# Patient Record
(demographics unavailable — no encounter records)

---

## 2025-04-16 NOTE — ASSESSMENT
[Vaccines Reviewed] : Immunizations reviewed today. Please see immunization details in the vaccine log within the immunization flowsheet.  [FreeTextEntry1] : Reviewed age appropriate preventive screening with patient today and importance of regular screening as indicated. Encouraged exercise of at least 30 mins daily of moderate activity as tolerated.  If unable to participate in moderate activity, encouraged walking daily for 20 to 30mins. Discussed healthy dietary intake of vegetables, whole grains, lean proteins with avoidance of high sugar and sodium intake. Completed labs in office today, will await results and notify patient accordingly EKG reviewed today-stable Chronic medications refilled

## 2025-04-16 NOTE — HEALTH RISK ASSESSMENT
[0] : 2) Feeling down, depressed, or hopeless: Not at all (0) [Never] : Never [With Family] : lives with family [] :  [Feels Safe at Home] : Feels safe at home [Fully functional (bathing, dressing, toileting, transferring, walking, feeding)] : Fully functional (bathing, dressing, toileting, transferring, walking, feeding) [Fully functional (using the telephone, shopping, preparing meals, housekeeping, doing laundry, using] : Fully functional and needs no help or supervision to perform IADLs (using the telephone, shopping, preparing meals, housekeeping, doing laundry, using transportation, managing medications and managing finances) [Good] : ~his/her~  mood as  good [Yes] : Yes [No] : In the past 12 months have you used drugs other than those required for medical reasons? No [Time Spent: ___ Minutes] : I spent [unfilled] minutes performing a depression screening for this patient. [Patient reported mammogram was normal] : Patient reported mammogram was normal [Patient reported colonoscopy was abnormal] : Patient reported colonoscopy was abnormal [de-identified] : Psych, renal [[id number]] : 0 [Reports changes in hearing] : Reports no changes in hearing [Reports changes in vision] : Reports no changes in vision [Reports changes in dental health] : Reports no changes in dental health [MammogramDate] : 08/24 [ColonoscopyDate] : 06/17 [ColonoscopyComments] : Polyp

## 2025-04-16 NOTE — HISTORY OF PRESENT ILLNESS
[FreeTextEntry1] : CPE [de-identified] : Here for CPE and medication refills No acute complaints Following w/ psych for mood disorder-doing well on current medications -HTN: Controlled; denies CP or dizziness -LDL at goal on lovastatin

## 2025-04-22 NOTE — ASSESSMENT
[FreeTextEntry1] :  # Facial Rhytides  BOTOX COSMETIC Procedure: Botulinum toxin injection Indication: Dynamic rhytides. Antiseptic: Alcohol Dilution and volume injected: 2.5 cc bacteriostatic saline to dissolve 300U Dysport vial. LOT [id number] Exp 02/28/2026 Location: forehead, crow's feet, glabella, upper lip Total number of units administered: 120U Dysport (equivalent of 40U Botox) Estimated Blood Loss: Minimal Complications: None Description of procedure: We discussed potential complications of the procedure including scar, worsening of the condition, infection, bleeding, failure to resolve the condition, and eyelid, hand, or foot weakness (depending on injection site).  The patient appeared to understand the risks and wished to proceed with treatment.  The affected areas were treated with injection into the subcutaneous plane.  A finger was held on the orbital rim to ascertain injections were performed above it (if applicable).  Following the procedure the patient ambulated to the exit and was discharged in excellent condition.  RTC PRN

## 2025-04-22 NOTE — HISTORY OF PRESENT ILLNESS
Spoke with patient and e-rx to pharmacy.   [FreeTextEntry1] : F/u facial rhytides [de-identified] : F/u for facial rhytides; here for cosmetic botox

## 2025-07-24 NOTE — HISTORY OF PRESENT ILLNESS
[TextBox_4] : 58yo presents for annual exam no complaints  takes gabapentin at night for hot flashes [Mammogramdate] : 2024 [PapSmeardate] : 2024 [BoneDensityDate] : 2022 [ColonoscopyDate] : utd

## 2025-07-24 NOTE — PHYSICAL EXAM
[MA] : MA [FreeTextEntry2] : Agnes [Appropriately responsive] : appropriately responsive [Alert] : alert [No Acute Distress] : no acute distress [Soft] : soft [Non-tender] : non-tender [Non-distended] : non-distended [Oriented x3] : oriented x3 [Examination Of The Breasts] : a normal appearance [No Masses] : no breast masses were palpable [Labia Majora] : normal [Labia Minora] : normal [Normal] : normal [Uterine Adnexae] : normal